# Patient Record
Sex: MALE | Race: WHITE | NOT HISPANIC OR LATINO
[De-identification: names, ages, dates, MRNs, and addresses within clinical notes are randomized per-mention and may not be internally consistent; named-entity substitution may affect disease eponyms.]

---

## 2018-12-17 ENCOUNTER — RECORDS - HEALTHEAST (OUTPATIENT)
Dept: ADMINISTRATIVE | Facility: OTHER | Age: 23
End: 2018-12-17

## 2019-06-25 ENCOUNTER — OFFICE VISIT - HEALTHEAST (OUTPATIENT)
Dept: FAMILY MEDICINE | Facility: CLINIC | Age: 24
End: 2019-06-25

## 2019-06-25 DIAGNOSIS — R41.840 INATTENTION: ICD-10-CM

## 2019-06-25 ASSESSMENT — MIFFLIN-ST. JEOR: SCORE: 1528.77

## 2019-09-26 ENCOUNTER — COMMUNICATION - HEALTHEAST (OUTPATIENT)
Dept: FAMILY MEDICINE | Facility: CLINIC | Age: 24
End: 2019-09-26

## 2019-10-01 ENCOUNTER — COMMUNICATION - HEALTHEAST (OUTPATIENT)
Dept: FAMILY MEDICINE | Facility: CLINIC | Age: 24
End: 2019-10-01

## 2019-10-01 ENCOUNTER — OFFICE VISIT - HEALTHEAST (OUTPATIENT)
Dept: FAMILY MEDICINE | Facility: CLINIC | Age: 24
End: 2019-10-01

## 2019-10-01 DIAGNOSIS — Z23 IMMUNIZATION DUE: ICD-10-CM

## 2019-10-01 DIAGNOSIS — M25.571 PAIN IN JOINT INVOLVING ANKLE AND FOOT, RIGHT: ICD-10-CM

## 2019-10-01 DIAGNOSIS — F90.0 ATTENTION DEFICIT HYPERACTIVITY DISORDER (ADHD), PREDOMINANTLY INATTENTIVE TYPE: ICD-10-CM

## 2019-10-01 ASSESSMENT — ANXIETY QUESTIONNAIRES
2. NOT BEING ABLE TO STOP OR CONTROL WORRYING: NOT AT ALL
1. FEELING NERVOUS, ANXIOUS, OR ON EDGE: NOT AT ALL
6. BECOMING EASILY ANNOYED OR IRRITABLE: NOT AT ALL
7. FEELING AFRAID AS IF SOMETHING AWFUL MIGHT HAPPEN: NOT AT ALL
GAD7 TOTAL SCORE: 1
3. WORRYING TOO MUCH ABOUT DIFFERENT THINGS: SEVERAL DAYS
5. BEING SO RESTLESS THAT IT IS HARD TO SIT STILL: NOT AT ALL
4. TROUBLE RELAXING: NOT AT ALL

## 2019-10-01 ASSESSMENT — PATIENT HEALTH QUESTIONNAIRE - PHQ9: SUM OF ALL RESPONSES TO PHQ QUESTIONS 1-9: 6

## 2019-10-29 ENCOUNTER — OFFICE VISIT - HEALTHEAST (OUTPATIENT)
Dept: FAMILY MEDICINE | Facility: CLINIC | Age: 24
End: 2019-10-29

## 2019-10-29 DIAGNOSIS — Z23 ENCOUNTER FOR ADMINISTRATION OF VACCINE: ICD-10-CM

## 2019-10-29 DIAGNOSIS — F90.0 ATTENTION DEFICIT HYPERACTIVITY DISORDER (ADHD), PREDOMINANTLY INATTENTIVE TYPE: ICD-10-CM

## 2019-10-29 DIAGNOSIS — R20.0 NUMBNESS, LIMB: ICD-10-CM

## 2019-10-29 LAB
HBA1C MFR BLD: 5.2 % (ref 3.5–6)
TSH SERPL DL<=0.005 MIU/L-ACNC: 0.67 UIU/ML (ref 0.3–5)

## 2019-11-06 ENCOUNTER — COMMUNICATION - HEALTHEAST (OUTPATIENT)
Dept: FAMILY MEDICINE | Facility: CLINIC | Age: 24
End: 2019-11-06

## 2019-11-06 DIAGNOSIS — F90.0 ATTENTION DEFICIT HYPERACTIVITY DISORDER (ADHD), PREDOMINANTLY INATTENTIVE TYPE: ICD-10-CM

## 2019-12-10 ENCOUNTER — COMMUNICATION - HEALTHEAST (OUTPATIENT)
Dept: FAMILY MEDICINE | Facility: CLINIC | Age: 24
End: 2019-12-10

## 2019-12-10 DIAGNOSIS — F90.0 ATTENTION DEFICIT HYPERACTIVITY DISORDER (ADHD), PREDOMINANTLY INATTENTIVE TYPE: ICD-10-CM

## 2020-01-09 ENCOUNTER — COMMUNICATION - HEALTHEAST (OUTPATIENT)
Dept: FAMILY MEDICINE | Facility: CLINIC | Age: 25
End: 2020-01-09

## 2020-01-09 DIAGNOSIS — F90.0 ATTENTION DEFICIT HYPERACTIVITY DISORDER (ADHD), PREDOMINANTLY INATTENTIVE TYPE: ICD-10-CM

## 2020-01-22 ENCOUNTER — OFFICE VISIT - HEALTHEAST (OUTPATIENT)
Dept: FAMILY MEDICINE | Facility: CLINIC | Age: 25
End: 2020-01-22

## 2020-01-22 DIAGNOSIS — S69.91XA INJURY OF FINGER OF RIGHT HAND, INITIAL ENCOUNTER: ICD-10-CM

## 2020-01-22 DIAGNOSIS — Z00.00 ROUTINE GENERAL MEDICAL EXAMINATION AT A HEALTH CARE FACILITY: ICD-10-CM

## 2020-01-22 DIAGNOSIS — F90.0 ATTENTION DEFICIT HYPERACTIVITY DISORDER (ADHD), PREDOMINANTLY INATTENTIVE TYPE: ICD-10-CM

## 2020-01-22 ASSESSMENT — MIFFLIN-ST. JEOR: SCORE: 1535.53

## 2020-02-18 ENCOUNTER — COMMUNICATION - HEALTHEAST (OUTPATIENT)
Dept: FAMILY MEDICINE | Facility: CLINIC | Age: 25
End: 2020-02-18

## 2020-02-18 DIAGNOSIS — F90.0 ATTENTION DEFICIT HYPERACTIVITY DISORDER (ADHD), PREDOMINANTLY INATTENTIVE TYPE: ICD-10-CM

## 2020-03-23 ENCOUNTER — COMMUNICATION - HEALTHEAST (OUTPATIENT)
Dept: FAMILY MEDICINE | Facility: CLINIC | Age: 25
End: 2020-03-23

## 2020-03-23 DIAGNOSIS — F90.0 ATTENTION DEFICIT HYPERACTIVITY DISORDER (ADHD), PREDOMINANTLY INATTENTIVE TYPE: ICD-10-CM

## 2020-05-05 ENCOUNTER — COMMUNICATION - HEALTHEAST (OUTPATIENT)
Dept: FAMILY MEDICINE | Facility: CLINIC | Age: 25
End: 2020-05-05

## 2020-05-05 DIAGNOSIS — F90.0 ATTENTION DEFICIT HYPERACTIVITY DISORDER (ADHD), PREDOMINANTLY INATTENTIVE TYPE: ICD-10-CM

## 2020-06-15 ENCOUNTER — COMMUNICATION - HEALTHEAST (OUTPATIENT)
Dept: FAMILY MEDICINE | Facility: CLINIC | Age: 25
End: 2020-06-15

## 2020-06-15 DIAGNOSIS — F90.0 ATTENTION DEFICIT HYPERACTIVITY DISORDER (ADHD), PREDOMINANTLY INATTENTIVE TYPE: ICD-10-CM

## 2020-07-14 ENCOUNTER — COMMUNICATION - HEALTHEAST (OUTPATIENT)
Dept: FAMILY MEDICINE | Facility: CLINIC | Age: 25
End: 2020-07-14

## 2020-07-14 DIAGNOSIS — F90.0 ATTENTION DEFICIT HYPERACTIVITY DISORDER (ADHD), PREDOMINANTLY INATTENTIVE TYPE: ICD-10-CM

## 2020-08-26 ENCOUNTER — COMMUNICATION - HEALTHEAST (OUTPATIENT)
Dept: FAMILY MEDICINE | Facility: CLINIC | Age: 25
End: 2020-08-26

## 2020-08-26 DIAGNOSIS — F90.0 ATTENTION DEFICIT HYPERACTIVITY DISORDER (ADHD), PREDOMINANTLY INATTENTIVE TYPE: ICD-10-CM

## 2020-09-11 ENCOUNTER — COMMUNICATION - HEALTHEAST (OUTPATIENT)
Dept: FAMILY MEDICINE | Facility: CLINIC | Age: 25
End: 2020-09-11

## 2020-09-29 ENCOUNTER — COMMUNICATION - HEALTHEAST (OUTPATIENT)
Dept: FAMILY MEDICINE | Facility: CLINIC | Age: 25
End: 2020-09-29

## 2020-09-29 DIAGNOSIS — F90.0 ATTENTION DEFICIT HYPERACTIVITY DISORDER (ADHD), PREDOMINANTLY INATTENTIVE TYPE: ICD-10-CM

## 2020-10-05 RX ORDER — DEXTROAMPHETAMINE SACCHARATE, AMPHETAMINE ASPARTATE MONOHYDRATE, DEXTROAMPHETAMINE SULFATE AND AMPHETAMINE SULFATE 5; 5; 5; 5 MG/1; MG/1; MG/1; MG/1
20 CAPSULE, EXTENDED RELEASE ORAL DAILY
Qty: 30 CAPSULE | Refills: 0 | Status: SHIPPED | OUTPATIENT
Start: 2020-10-05

## 2020-10-07 ENCOUNTER — OFFICE VISIT - HEALTHEAST (OUTPATIENT)
Dept: FAMILY MEDICINE | Facility: CLINIC | Age: 25
End: 2020-10-07

## 2020-10-07 DIAGNOSIS — F90.0 ATTENTION DEFICIT HYPERACTIVITY DISORDER (ADHD), PREDOMINANTLY INATTENTIVE TYPE: ICD-10-CM

## 2021-05-26 ASSESSMENT — PATIENT HEALTH QUESTIONNAIRE - PHQ9: SUM OF ALL RESPONSES TO PHQ QUESTIONS 1-9: 6

## 2021-05-28 ASSESSMENT — ANXIETY QUESTIONNAIRES: GAD7 TOTAL SCORE: 1

## 2021-05-30 NOTE — PATIENT INSTRUCTIONS - HE
Referral placed today - you will get a phone call to help schedule this ADHD testing  -if you don't get a phone call within 7 days, call [facility] ([phone]) to help assist in scheduling

## 2021-05-30 NOTE — PROGRESS NOTES
Assessment/Plan:    1. Inattention  Pt interested in ADHD testing which is reasonable based on symptoms, referral placed today. Will plan to meet again in clinic after testing completed to discuss results and treatment options/plan.  - Ambulatory referral to Psychology      Follow up: after ADHD testing is completed to discuss results and treatment    Toshia Faria MD  [facility]    Subjective:    Patient ID: Alfredo Riggs is a 24 y.o. 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 No pertinent past medical history.  History reviewed. No pertinent surgical history.  No current outpatient medications on file prior to visit.     No current facility-administered medications on file prior to visit.      No Known Allergies  Social History     Socioeconomic History     Marital status: Single     Spouse name: Not on file     Number of children: Not on file     Years of education: Not on file     Highest education level: Not on file   Occupational History     Not on file   Social Needs     Financial resource strain: Not on file     Food insecurity:     Worry: Not on file     Inability: Not on file     Transportation needs:     Medical: Not on file     Non-medical: Not on file   Tobacco Use     Smoking status: Current Some Day Smoker     Packs/day: 0.30     Smokeless tobacco: Current User   Substance and Sexual Activity     Alcohol use: Yes     Frequency: 2-4 times a month     Drinks per session: 5 or 6     Binge frequency: Monthly     Drug use: Never      "Sexual activity: Not on file   Lifestyle     Physical activity:     Days per week: Not on file     Minutes per session: Not on file     Stress: Not on file   Relationships     Social connections:     Talks on phone: Not on file     Gets together: Not on file     Attends Anabaptist service: Not on file     Active member of club or organization: Not on file     Attends meetings of clubs or organizations: Not on file     Relationship status: Not on file     Intimate partner violence:     Fear of current or ex partner: Not on file     Emotionally abused: Not on file     Physically abused: Not on file     Forced sexual activity: Not on file   Other Topics Concern     Not on file   Social History Narrative     Not on file     Review of systems is as stated in HPI, and the remainder of the 10 system review is otherwise negative.    Objective:      /80   Pulse 84   Ht 5' 6\" (1.676 m)   Wt 132 lb 8 oz (60.1 kg)   BMI 21.39 kg/m      General appearance: awake, NAD  HEENT: atraumatic, normocephalic, no scleral icterus or injection, ears and nose grossly normal, moist mucous membranes  Lungs: breathing comfortably on room air  Extremities: moving all extremities  Skin: no rashes or lesions  Neuro: alert, oriented x3, CNs grossly intact, no focal deficits appreciated  Psych: normal mood/affect/behavior, answering questions appropriately, linear thought process      "

## 2021-06-01 NOTE — TELEPHONE ENCOUNTER
Left message to call back for: results   Information to relay to patient:  Below message      Was unable to leave a VM

## 2021-06-01 NOTE — TELEPHONE ENCOUNTER
Prior Authorization Request  Who s requesting:  Patient  Pharmacy Name and Location: [facility]  Medication Name: dextroamphetamine-amphetamine (ADDERALL XR) 20 MG 24 hr capsule  Insurance Plan: [employer]  Insurance Member ID Number:  [id number]  Informed patient that prior authorizations can take up to 10 business days for response:   Yes  Okay to leave a detailed message: Patient's voicemail does not work.   [phone]

## 2021-06-01 NOTE — TELEPHONE ENCOUNTER
Please call pt to inform him that I received his ADHD test results which confirmed ADHD diagnosis. He can make an appointment when able/desired to discuss treatment of this.    Dr Faria

## 2021-06-01 NOTE — PATIENT INSTRUCTIONS - HE
[facility] records request: Request on 10/1/2019 could not be completed. This may be due to a technical issue, no data available, or the patient may not have given consent (VA does not support point-of-care consent). If the patient has not given consent, the patient must go to a VA medical center or VA website www.va.gov/vler to give consent (Form )

## 2021-06-01 NOTE — PROGRESS NOTES
Assessment/Plan:    1. Attention deficit hyperactivity disorder (ADHD), predominantly inattentive type  Patient formally diagnosed with ADHD, here to discuss treatment options.  Discussed treatment with a stimulant medication, discussed these are controlled substances, discussed potential risks/side effects-patient interested in pursuing treatment.  Will start Adderall extended release 20 mg daily and increase as needed for symptom control.  Follow-up in 4 weeks.  - dextroamphetamine-amphetamine (ADDERALL XR) 20 MG 24 hr capsule; Take 1 capsule (20 mg total) by mouth daily.  Dispense: 30 capsule; Refill: 0    2. Pain in joint involving ankle and foot, right  Patient with history of right ankle sprain and intermittent stiffness and pain now.  Suspect this is related to residual injury; recommend treatment with the following: Ice/heat to area, Tylenol/ibuprofen as needed, provided printed exercises the patient can do at home today.  No indication for imaging at this time.    3. Immunization due  Prior records patient is due for immunizations, patient states he should be up-to-date through the , he tried to obtain records from [facility] but was unsuccessful, he will try again.  Flu shot administered today.      Follow up: 4 weeks for recheck    Toshia Fraia MD  [facility]    Subjective:    Patient ID: Alfredo Riggs is a 24 y.o. male is here today for follow-up ADHD, right ankle pain    ADHD  -Patient completed ADHD formal evaluation and tested positive for predominantly inattentive ADHD  -Patient here to discuss treatment options  -Patient shares that he has always struggled with school, he recently got out of the  and is planning to go to college -he failed out of college in the past and is worried that this will happen again if he does not manage his ADHD    History of right ankle sprain  -Patient reports spraining his right ankle around 2015  -Reports that it was swollen and bruised  "at that time but then seemed to heal okay  -Patient denies participating in formal physical therapy but did do general range of motion exercises at the time  -He notes now getting occasional stiffness and \"popping\" of the ankle  -Wondering if there is anything he should be doing for this or more work-up that needs to be done      Patient Active Problem List   Diagnosis     Attention deficit hyperactivity disorder (ADHD), predominantly inattentive type     History reviewed. No pertinent surgical history.  No current outpatient medications on file prior to visit.     No current facility-administered medications on file prior to visit.      No Known Allergies  Social History     Socioeconomic History     Marital status: Single     Spouse name: Not on file     Number of children: Not on file     Years of education: Not on file     Highest education level: Not on file   Occupational History     Not on file   Social Needs     Financial resource strain: Not on file     Food insecurity:     Worry: Not on file     Inability: Not on file     Transportation needs:     Medical: Not on file     Non-medical: Not on file   Tobacco Use     Smoking status: Current Some Day Smoker     Packs/day: 0.30     Smokeless tobacco: Current User   Substance and Sexual Activity     Alcohol use: Yes     Frequency: 2-4 times a month     Drinks per session: 5 or 6     Binge frequency: Monthly     Drug use: Never     Sexual activity: Not on file   Lifestyle     Physical activity:     Days per week: Not on file     Minutes per session: Not on file     Stress: Not on file   Relationships     Social connections:     Talks on phone: Not on file     Gets together: Not on file     Attends Catholic service: Not on file     Active member of club or organization: Not on file     Attends meetings of clubs or organizations: Not on file     Relationship status: Not on file     Intimate partner violence:     Fear of current or ex partner: Not on file     " Emotionally abused: Not on file     Physically abused: Not on file     Forced sexual activity: Not on file   Other Topics Concern     Not on file   Social History Narrative     Not on file     Review of systems is as stated in HPI, and the remainder of system review is otherwise negative.    Objective:      /60   Pulse (!) 109   Wt 142 lb 7 oz (64.6 kg)   BMI 22.99 kg/m      General appearance: awake, NAD  HEENT: atraumatic, normocephalic, no scleral icterus or injection, ears and nose grossly normal, moist mucous membranes  Lungs: breathing comfortably on room air  Extremities: no LE edema bilaterally, moving all extremities, normal right ankle ROM without swelling  Skin: no rashes or lesions  Neuro: alert, oriented x3, CNs grossly intact, no focal deficits appreciated  Psych: normal mood/affect/behavior, answering questions appropriately, linear thought process

## 2021-06-02 NOTE — PATIENT INSTRUCTIONS - HE
ADHD:  -continue adderall XR 20mg daily for now  -keep an eye on symptoms   -send Dr Faria a [facility] message in 1-2 months about how things are going - could consider changing dose if needed to 30mg daily  -typically have in person visit every 3-6 months     Arm numbness:  -will check blood tests today - diabetes, thyroid  -if blood tests are normal then we should consider other workup looking at neck and nerves such as neck MRI and EMG (nerve test of the limbs)

## 2021-06-02 NOTE — PROGRESS NOTES
Assessment/Plan:    1. Attention deficit hyperactivity disorder (ADHD), predominantly inattentive type  Overall doing well at current Adderall dose of Adderall XR 20 mg daily.  Will continue current dose.  Follow-up in 3 to 6 months.    2. Encounter for administration of vaccine  Due for vaccines as below, administered today.  - Tdap vaccine,  8yo or older,  IM  - Pneumococcal polysaccharide vaccine 23-valent 1 yo or older, subq/IM    3. Numbness, limb  Patient worried that intermittent limb numbness may be related to diabetes, requesting testing today.  Discussed that his symptoms are atypical for diabetes related neuropathy but reasonable to evaluate for thyroid and diabetes with blood test today.  Discussed if blood tests are normal then I would consider work-up to evaluate neck and potential nerve compression such as neck MRI and/or EMG.  - Glycosylated Hemoglobin A1c  - Thyroid Cascade      Follow up: pending blood test results    Toshia Faria MD  [facility]    Subjective:    Patient ID: Alfredo Riggs is a 24 y.o. male is here today for f/u ADHD, arm numbness    F/u ADHD  -definitely noticing improvement on adderall XR 20mg daily  -feeling like he is more productive and organized  -more energy to complete tasks  -reports one episode of drinking on a Friday and woke up feeling horrible - extremely depressed for the day but felt normal the next day; didn't take adderall on Sat/Sun; now significantly decreased alcohol use which has been fine  -states no appetite suppression  -no sleep disturbance  -no chest pain, shortness of breath, palpitations      Arm numbness  -started 1.5-2 years ago  -talked to mom about it  -worried it could be diabetes related - reports poor diet, drinking energy drinks  -has numbness in both arm and legs   -can't lay down on couch on arm rest because head tingles  -arms will tingle with different movements      Patient Active Problem List   Diagnosis     Attention  deficit hyperactivity disorder (ADHD), predominantly inattentive type     History reviewed. No pertinent surgical history.  Current Outpatient Medications on File Prior to Visit   Medication Sig Dispense Refill     dextroamphetamine-amphetamine (ADDERALL XR) 20 MG 24 hr capsule Take 1 capsule (20 mg total) by mouth daily. 30 capsule 0     No current facility-administered medications on file prior to visit.      No Known Allergies  Social History     Socioeconomic History     Marital status: Single     Spouse name: Not on file     Number of children: Not on file     Years of education: Not on file     Highest education level: Not on file   Occupational History     Not on file   Social Needs     Financial resource strain: Not on file     Food insecurity:     Worry: Not on file     Inability: Not on file     Transportation needs:     Medical: Not on file     Non-medical: Not on file   Tobacco Use     Smoking status: Current Some Day Smoker     Packs/day: 0.30     Smokeless tobacco: Current User   Substance and Sexual Activity     Alcohol use: Yes     Frequency: 2-4 times a month     Drinks per session: 5 or 6     Binge frequency: Monthly     Drug use: Never     Sexual activity: Not on file   Lifestyle     Physical activity:     Days per week: Not on file     Minutes per session: Not on file     Stress: Not on file   Relationships     Social connections:     Talks on phone: Not on file     Gets together: Not on file     Attends Synagogue service: Not on file     Active member of club or organization: Not on file     Attends meetings of clubs or organizations: Not on file     Relationship status: Not on file     Intimate partner violence:     Fear of current or ex partner: Not on file     Emotionally abused: Not on file     Physically abused: Not on file     Forced sexual activity: Not on file   Other Topics Concern     Not on file   Social History Narrative     Not on file     Review of systems is as stated in HPI, and  the remainder of system review is otherwise negative.    Objective:      /60   Wt 138 lb 1 oz (62.6 kg)   BMI 22.28 kg/m      General appearance: awake, NAD  HEENT: atraumatic, normocephalic, no scleral icterus or injection, ears and nose grossly normal, moist mucous membranes  Neck: supple, no lymphadenopathy, normal ROM  Lungs: breathing comfortably on room air  Extremities: moving all extremities - no hand/arm tingling with ROM and strength evaluation  Skin: no rashes or lesions  Neuro: alert, oriented x3, CNs grossly intact, no focal deficits appreciated  Psych: normal mood/affect/behavior, answering questions appropriately, linear thought process

## 2021-06-03 VITALS
WEIGHT: 142.44 LBS | DIASTOLIC BLOOD PRESSURE: 60 MMHG | BODY MASS INDEX: 22.99 KG/M2 | SYSTOLIC BLOOD PRESSURE: 120 MMHG | HEART RATE: 109 BPM

## 2021-06-03 VITALS
WEIGHT: 138.06 LBS | SYSTOLIC BLOOD PRESSURE: 100 MMHG | DIASTOLIC BLOOD PRESSURE: 60 MMHG | BODY MASS INDEX: 22.28 KG/M2

## 2021-06-03 VITALS — HEIGHT: 66 IN | BODY MASS INDEX: 21.29 KG/M2 | WEIGHT: 132.5 LBS

## 2021-06-03 NOTE — TELEPHONE ENCOUNTER
Patient is going to be out of medication requesting to process as soon as possible.  Controlled Substance Refill Request  Medication Name:   Requested Prescriptions     Pending Prescriptions Disp Refills     dextroamphetamine-amphetamine (ADDERALL XR) 20 MG 24 hr capsule 30 capsule 0     Sig: Take 1 capsule (20 mg total) by mouth daily.   Date Last Fill: 10/01/19  Pharmacy: [facility] # [id number]      Submit electronically to pharmacy  Controlled Substance Agreement Date Scanned:   Encounter-Level CSA Scan Date:    There are no encounter-level csa scan date.       Last office visit with prescriber/PCP: 10/29/2019 Toshia Faria MD OR same dept: 10/29/2019 Toshia Faria MD OR same specialty: 10/29/2019 Toshia Faria MD  Last physical: Visit date not found Last MTM visit: Visit date not found

## 2021-06-04 VITALS
SYSTOLIC BLOOD PRESSURE: 100 MMHG | DIASTOLIC BLOOD PRESSURE: 60 MMHG | HEIGHT: 66 IN | BODY MASS INDEX: 21.79 KG/M2 | HEART RATE: 92 BPM | WEIGHT: 135.56 LBS

## 2021-06-04 VITALS
BODY MASS INDEX: 21.11 KG/M2 | WEIGHT: 129 LBS | DIASTOLIC BLOOD PRESSURE: 70 MMHG | SYSTOLIC BLOOD PRESSURE: 120 MMHG | HEART RATE: 99 BPM

## 2021-06-04 NOTE — TELEPHONE ENCOUNTER
Controlled Substance Refill Request  Medication Name:   Requested Prescriptions     Pending Prescriptions Disp Refills     dextroamphetamine-amphetamine (ADDERALL XR) 20 MG 24 hr capsule 30 capsule 0     Sig: Take 1 capsule (20 mg total) by mouth daily.     Date Last Fill:11/06/19  Pharmacy:    [id number] [place] [address] AT [place] [address]   Submit electronically to pharmacy  Controlled Substance Agreement Date Scanned:   Encounter-Level CSA Scan Date:    There are no encounter-level csa scan date.       Last office visit with prescriber/PCP: 10/29/2019 Toshia Faria MD OR same dept: 10/29/2019 Toshia Faria MD OR same specialty: 10/29/2019 Toshia Faria MD  Last physical: Visit date not found Last MTM visit: Visit date not found    Patient is out of medication , Please expedite .  Thank You

## 2021-06-05 NOTE — PATIENT INSTRUCTIONS - HE
Schedule dental visit     Keep an eye on night sweats and any other unusual symptoms and send Dr Faria a [employer] message with update if needed

## 2021-06-05 NOTE — PROGRESS NOTES
Assessment/Plan:   Alfredo is a 24 year old male here for physical.    1. Routine general medical examination at a health care facility  Physical completed today.  Up-to-date with immunizations.  No labs indicated at this time.    2. Attention deficit hyperactivity disorder (ADHD), predominantly inattentive type  History ADHD, now taking Adderall extended release 20 mg daily which patient states is working very well for him.  He denies side effects.  Patient recently picked up a refill.  We will continue this dose, follow-up 6 months for recheck.    3. Injury of finger of right hand, initial encounter  Patient with injury to right fourth digit at nail 4 weeks ago.  Nail is black from trauma, discussed nail will likely fall off.  Normal range of motion and sensation and no tenderness with palpation on exam today.  Discussed could obtain x-ray for further evaluation but given exam unlikely that he has fracture, patient declines x-ray at this time.    I have had an Advance Directives discussion with the patient.    Follow up: 6 months for ADHD recheck    Toshia Faria MD  [facility]    Subjective:     Alfredo Riggs is a 24 y.o. male who presents for an annual exam.     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 (working through [facility]), applied to school  -overall sleeping ok - sometimes difficulty falling asleep (may be related to caffeine and working out)  -feels like eating better d/t adderall (trying to bulk up) - woke up today sweaty; no weight loss, no fevers but occasional chills, thinks may have cold coming on    R 4th digit   -smashed with hammer at home right before quyen (1 month ago)  -nail is black/discolored  -iced and elevated after injury, used splint  -moving finger normally, now ok to put pressure on it, normal sensation    Healthy Habits:   Healthy Diet:  Yes  Regular Exercise: Yes  Sunscreen Use: Yes  Dental Visits Regularly: due for visit  Seat Belt: Yes    Health Maintenance reviewed:  Lipid Profile: does not need  Glucose Screen: does not need  Colonoscopy: No    Immunization History   Administered Date(s) Administered     DTP 1995, 1995, 1995, 09/13/1996, 08/14/2000     Hep B, historic 1995, 1995, 05/08/1996     HiB, historic,unspecified 1995, 1995, 1995, 09/13/1996     Influenza,seasonal,quad inj =/> 6months 10/01/2019     MMR 09/13/1996, 08/14/2000     Meningococcal MCV4P 08/23/2007     OPV,Trivalent,Historic(4840-8350 only) 1995, 1995, 1995, 08/14/2000     Pneumo Polysac 23-V 10/29/2019     Td, Adult, Absorbed 07/19/2006     Tdap 10/29/2019     Varicella 08/14/2000     Immunization status: up to date and documented.    Current Outpatient Medications   Medication Sig Dispense Refill     dextroamphetamine-amphetamine (ADDERALL XR) 20 MG 24 hr capsule Take 1 capsule (20 mg total) by mouth daily. 30 capsule 0     No current facility-administered medications for this visit.      History reviewed. No pertinent past medical history.  History reviewed. No pertinent surgical history.  Patient has no known allergies.  Family History   Problem Relation Age of Onset     Arthritis Mother      No Medical Problems Father      No Medical Problems Brother      Heart disease Maternal Grandmother      No Medical Problems Maternal Grandfather      No Medical Problems Paternal Grandmother      No Medical Problems Paternal Grandfather      ADD / ADHD Neg Hx      Social History     Socioeconomic History     Marital status: Single     Spouse name: Not on file     Number of children: Not on file     Years of education: Not on file     Highest education level: Not on file   Occupational History     Not on file   Social Needs     Financial resource strain: Not on file     Food insecurity:     Worry: Not on file      "Inability: Not on file     Transportation needs:     Medical: Not on file     Non-medical: Not on file   Tobacco Use     Smoking status: Current Some Day Smoker     Packs/day: 0.30     Smokeless tobacco: Current User   Substance and Sexual Activity     Alcohol use: Yes     Frequency: 2-4 times a month     Drinks per session: 5 or 6     Binge frequency: Monthly     Drug use: Never     Sexual activity: Not on file   Lifestyle     Physical activity:     Days per week: Not on file     Minutes per session: Not on file     Stress: Not on file   Relationships     Social connections:     Talks on phone: Not on file     Gets together: Not on file     Attends Muslim service: Not on file     Active member of club or organization: Not on file     Attends meetings of clubs or organizations: Not on file     Relationship status: Not on file     Intimate partner violence:     Fear of current or ex partner: Not on file     Emotionally abused: Not on file     Physically abused: Not on file     Forced sexual activity: Not on file   Other Topics Concern     Not on file   Social History Narrative     Not on file       Review of Systems  General:  Denies problem  Eyes: Denies problem  Ears/Nose/Throat: Denies problem  Cardiovascular: Denies problem  Respiratory:  Denies problem  Gastrointestinal:  Denies problem   Genitourinary: Denies problem  Musculoskeletal:  Denies problem except injured right fourth digit  Skin: Denies problem  Neurologic: Denies problem  Psychiatric: Denies problem except history ADHD  Endocrine: Denies problem  Heme/Lymphatic: Denies problem   Allergic/Immunologic: Denies problem    Objective:        Vitals:    01/22/20 1408   BP: 100/60   Pulse: 92   Weight: 135 lb 9 oz (61.5 kg)   Height: 5' 5.55\" (1.665 m)     Body mass index is 22.18 kg/m .    Physical Exam:  General Appearance: Alert, pleasant, appears stated age  Head: Normocephalic, without obvious abnormality  Eyes: PERRL, conjunctiva/corneas clear, " EOM's intact  Ears: Normal TM's and external ear canals, both ears  Nose: Nares normal, septum midline,mucosa normal, no drainage  Throat: Lips, mucosa, and tongue normal; teeth and gums normal; oropharynx is clear  Neck: Supple,without lymphadenopathy, no thyromegaly or nodules noted  Lungs: Clear to auscultation bilaterally, respirations unlabored, no wheezing or crackles  Heart: Regular rate and rhythm, no murmur   Abdomen: Soft, non-tender, no masses, no organomegaly  Extremities: Extremities with strong and symmetric pulses, no cyanosis or edema; right fourth digit with obvious prior injury, nail completely black, normal sensation and range of motion, no pain with palpation, capillary refill less than 2 seconds  Skin: Skin color, texture normal, no rashes or lesions  Neurologic: Grossly normal, no focal deficits

## 2021-06-05 NOTE — TELEPHONE ENCOUNTER
Controlled Substance Refill Request  Medication Name:   Requested Prescriptions     Pending Prescriptions Disp Refills     dextroamphetamine-amphetamine (ADDERALL XR) 20 MG 24 hr capsule 30 capsule 0     Sig: Take 1 capsule (20 mg total) by mouth daily.     Date Last Fill: 12/11/19  [facility]  Submit electronically to pharmacy  Controlled Substance Agreement on file:   Encounter-Level CSA Scan Date:    There are no encounter-level csa scan date.        Last office visit:  10/29/19

## 2021-06-06 NOTE — TELEPHONE ENCOUNTER
reviewed - last fill 1/12 - ok for refill    Toshia Faria MD  [facility] [place]

## 2021-06-06 NOTE — TELEPHONE ENCOUNTER
Controlled Substance Refill Request  Medication Name:   Requested Prescriptions     Pending Prescriptions Disp Refills     dextroamphetamine-amphetamine (ADDERALL XR) 20 MG 24 hr capsule 30 capsule 0     Sig: Take 1 capsule (20 mg total) by mouth daily.     Date Last Fill: 1/10/20  [facility]  Submit electronically to pharmacy  Controlled Substance Agreement on file:   Encounter-Level CSA Scan Date:    There are no encounter-level csa scan date.        Last office visit:  1/22/20

## 2021-06-07 NOTE — TELEPHONE ENCOUNTER
Controlled Substance Refill Request  Medication Name:   Requested Prescriptions     Pending Prescriptions Disp Refills     dextroamphetamine-amphetamine (ADDERALL XR) 20 MG 24 hr capsule 30 capsule 0     Sig: Take 1 capsule (20 mg total) by mouth daily.     Date Last Fill: 2/21/20  [facility]ed [facility]  Submit electronically to pharmacy  Controlled Substance Agreement on file:   Encounter-Level CSA Scan Date:    There are no encounter-level csa scan date.        Last office visit:  1/22/20

## 2021-06-08 NOTE — TELEPHONE ENCOUNTER
Controlled Substance Refill Request  Medication Name:   Requested Prescriptions     Pending Prescriptions Disp Refills     dextroamphetamine-amphetamine (ADDERALL XR) 20 MG 24 hr capsule 30 capsule 0     Sig: Take 1 capsule (20 mg total) by mouth daily.     Date Last Fill: 3/24/20  [facility]  Submit electronically to pharmacy  Controlled Substance Agreement on file:   Encounter-Level CSA Scan Date:    There are no encounter-level csa scan date.        Last office visit:  1/22/20

## 2021-06-08 NOTE — TELEPHONE ENCOUNTER
Controlled Substance Refill Request  Medication Name:   Requested Prescriptions     Pending Prescriptions Disp Refills     dextroamphetamine-amphetamine (ADDERALL XR) 20 MG 24 hr capsule 30 capsule 0     Sig: Take 1 capsule (20 mg total) by mouth daily.     Date Last Fill: 5/6/20  [facility]  Submit electronically to pharmacy  Controlled Substance Agreement on file:   Encounter-Level CSA Scan Date:    There are no encounter-level csa scan date.        Last office visit:  1/22/20

## 2021-06-09 NOTE — TELEPHONE ENCOUNTER
reviewed - last fill 6/16 - ok for refill. Needs visit (can be virtual).    Toshia Faria MD  [facility] [place]

## 2021-06-09 NOTE — TELEPHONE ENCOUNTER
Controlled Substance Refill Request  Medication Name:   Requested Prescriptions     Pending Prescriptions Disp Refills     dextroamphetamine-amphetamine (ADDERALL XR) 20 MG 24 hr capsule 30 capsule 0     Sig: Take 1 capsule (20 mg total) by mouth daily.     Date Last Fill: 6/16/20  [facility]  Submit electronically to pharmacy  Controlled Substance Agreement on file:   Encounter-Level CSA Scan Date:    There are no encounter-level csa scan date.        Last office visit:  1/22/20

## 2021-06-11 NOTE — TELEPHONE ENCOUNTER
Left message to call back for: pt  Information to relay to patient:  2nd message to call and schedule virtual visit with dr. patel before next refill request.

## 2021-06-11 NOTE — TELEPHONE ENCOUNTER
Controlled Substance Refill Request  Medication Name:   Requested Prescriptions     Pending Prescriptions Disp Refills     dextroamphetamine-amphetamine (ADDERALL XR) 20 MG 24 hr capsule 30 capsule 0     Sig: Take 1 capsule (20 mg total) by mouth daily.     Date Last Fill: 7/14/20  [facility]  Submit electronically to pharmacy  Controlled Substance Agreement on file:   Encounter-Level CSA Scan Date:    There are no encounter-level csa scan date.        Last office visit:  1/22/20  Ashely Candelario RN, MA  [facility]    Triage Nurse Advisor

## 2021-06-12 NOTE — PROGRESS NOTES
Assessment/Plan:    1. Attention deficit hyperactivity disorder (ADHD), predominantly inattentive type  Patient with history of ADHD, currently taking Adderall extended release 20 mg daily.  Overall he feels like this is going well, he does report a few occasional side effects such as decreased appetite and sleep issues; we discussed if these persist then it would be reasonable to decrease dose versus switch to immediate release formulation, patient will continue to monitor and let me know if changes needed.  Controlled substance agreement completed and signed today, sent to scanning.  Recommend follow-up in 3 months, sooner if needing changes.      Follow up: 3 months for recheck    Toshia Faria MD  [facility]    Subjective:    Patient ID: Alfredo Riggs is a 25 y.o. male is here today for med ck    Med ck  -quit job and going to school, moving soon but unsure where   -tries to take it in the morning  -feels like overall the medication is good and helpful  -does still noticing some procrastination but when working/focusing is doing really well   -does notice some decrease in appetite - wt down 6 lbs since January 2020  -perhaps a little issues sleeping when he takes the adderall - will sometimes take melatonin to help - if he takes time to wind down then sleeps fine  -doesn't necessarily notice jittery when taking adderall  -trying to wean down on caffeine   -hasn't been playing as much video games lately      Patient Active Problem List   Diagnosis     Attention deficit hyperactivity disorder (ADHD), predominantly inattentive type     History reviewed. No pertinent surgical history.  Current Outpatient Medications on File Prior to Visit   Medication Sig Dispense Refill     dextroamphetamine-amphetamine (ADDERALL XR) 20 MG 24 hr capsule Take 1 capsule (20 mg total) by mouth daily. 30 capsule 0     No current facility-administered medications on file prior to visit.      No Known  Allergies  Social History     Socioeconomic History     Marital status: Single     Spouse name: Not on file     Number of children: Not on file     Years of education: Not on file     Highest education level: Not on file   Occupational History     Not on file   Social Needs     Financial resource strain: Not on file     Food insecurity     Worry: Not on file     Inability: Not on file     Transportation needs     Medical: Not on file     Non-medical: Not on file   Tobacco Use     Smoking status: Current Some Day Smoker     Packs/day: 0.30     Smokeless tobacco: Current User   Substance and Sexual Activity     Alcohol use: Yes     Frequency: 2-4 times a month     Drinks per session: 5 or 6     Binge frequency: Monthly     Drug use: Never     Sexual activity: Not on file   Lifestyle     Physical activity     Days per week: Not on file     Minutes per session: Not on file     Stress: Not on file   Relationships     Social connections     Talks on phone: Not on file     Gets together: Not on file     Attends Jain service: Not on file     Active member of club or organization: Not on file     Attends meetings of clubs or organizations: Not on file     Relationship status: Not on file     Intimate partner violence     Fear of current or ex partner: Not on file     Emotionally abused: Not on file     Physically abused: Not on file     Forced sexual activity: Not on file   Other Topics Concern     Not on file   Social History Narrative     Not on file     Family History   Problem Relation Age of Onset     Arthritis Mother      No Medical Problems Father      No Medical Problems Brother      Heart disease Maternal Grandmother      No Medical Problems Maternal Grandfather      No Medical Problems Paternal Grandmother      No Medical Problems Paternal Grandfather      ADD / ADHD Neg Hx      Review of systems is as stated in HPI, and the remainder of system review is otherwise negative.    Objective:      /70    Pulse 99   Wt 129 lb (58.5 kg)   BMI 21.11 kg/m      General appearance: awake, NAD  HEENT: atraumatic, normocephalic, no scleral icterus or injection  Lungs: breathing comfortably on room air  Extremities: moving all extremities  Neuro: alert, oriented x3, CNs grossly intact, no focal deficits appreciated  Psych: normal mood/affect/behavior, answering questions appropriately, linear thought process       Attending Only

## 2021-06-12 NOTE — PATIENT INSTRUCTIONS - HE
Will wait for flu shot and HPV vaccines until insurance is in place - can get flu shot at pharmacy if desired

## 2021-06-12 NOTE — TELEPHONE ENCOUNTER
reviewed - last fill 8/31 - ok for refill     Toshia Faria MD  [facility]

## 2021-06-12 NOTE — TELEPHONE ENCOUNTER
Controlled Substance Refill Request  Medication Name:   Requested Prescriptions     Pending Prescriptions Disp Refills     dextroamphetamine-amphetamine (ADDERALL XR) 20 MG 24 hr capsule 30 capsule 0     Sig: Take 1 capsule (20 mg total) by mouth daily.     Date Last Fill: 8/31/2020  [facility]  Submit electronically to pharmacy  Controlled Substance Agreement on file:   Encounter-Level CSA Scan Date:    There are no encounter-level csa scan date.        Last office visit:  1/22/2020

## 2021-06-16 PROBLEM — F90.0 ATTENTION DEFICIT HYPERACTIVITY DISORDER (ADHD), PREDOMINANTLY INATTENTIVE TYPE: Status: ACTIVE | Noted: 2019-10-03

## 2021-06-16 NOTE — TELEPHONE ENCOUNTER
Telephone Encounter by Bria Anaya at 10/4/2019  2:06 PM     Author: Bria Anaya Service: -- Author Type: --    Filed: 10/4/2019  2:06 PM Encounter Date: 10/1/2019 Status: Signed    : Bria Anaya APPROVED:    Approval start date:10/2/19  Approval end date:10/2/2021    Pharmacy has been notified of approval and will contact patient when medication is ready for pickup.

## 2021-06-16 NOTE — TELEPHONE ENCOUNTER
Telephone Encounter by Bria Anaya at 10/2/2019  9:52 AM     Author: Bria Anaya Service: -- Author Type: --    Filed: 10/2/2019  9:52 AM Encounter Date: 10/1/2019 Status: Signed    : Bria Anaya       [facility] team  [phone]  Pool: [facility] ([id number])          PA has been initiated.             Response will be received via fax and may take up to 5-10 business days depending on plan

## 2021-06-20 NOTE — LETTER
Letter by Toshia Faria MD at      Author: Toshia Faria MD Service: -- Author Type: --    Filed:  Encounter Date: 9/11/2020 Status: (Other)         Alfredo Riggs  [address] [place] [address]      September 11, 2020      Dear Alfredo Riggs,    Per our refill protocol, you are due for a medication check office visit. Your prescription for ADDERALL was sent to your pharmacy (08.31.2020). Please call [phone] to schedule a VIRTUAL VISIT with DR FARIA before your next refill is needed to avoid delays.    Thank you,  [facility]

## 2021-08-07 ENCOUNTER — HEALTH MAINTENANCE LETTER (OUTPATIENT)
Age: 26
End: 2021-08-07

## 2021-10-02 ENCOUNTER — HEALTH MAINTENANCE LETTER (OUTPATIENT)
Age: 26
End: 2021-10-02

## 2022-09-03 ENCOUNTER — HEALTH MAINTENANCE LETTER (OUTPATIENT)
Age: 27
End: 2022-09-03

## 2023-09-30 ENCOUNTER — HEALTH MAINTENANCE LETTER (OUTPATIENT)
Age: 28
End: 2023-09-30